# Patient Record
Sex: FEMALE | URBAN - METROPOLITAN AREA
[De-identification: names, ages, dates, MRNs, and addresses within clinical notes are randomized per-mention and may not be internally consistent; named-entity substitution may affect disease eponyms.]

---

## 2023-12-08 ENCOUNTER — APPOINTMENT (RX ONLY)
Dept: URBAN - METROPOLITAN AREA CLINIC 17 | Facility: CLINIC | Age: 48
Setting detail: DERMATOLOGY
End: 2023-12-08

## 2023-12-08 DIAGNOSIS — Z41.9 ENCOUNTER FOR PROCEDURE FOR PURPOSES OTHER THAN REMEDYING HEALTH STATE, UNSPECIFIED: ICD-10-CM

## 2023-12-08 PROCEDURE — ? BOTOX

## 2023-12-08 NOTE — PROCEDURE: BOTOX
Depressor Anguli Oris Units: 0
Detail Level: Detailed
Additional Area 5 Location: neck
Show Additional Area 4: Yes
Additional Area 4 Location: chin
Show Ucl Units: No
Additional Area 3 Location: upper lip
Lot #: [id number]
Additional Area 6 Units: 41
Expiration Date (Month Year): 04/30/2026
Additional Area 2 Location: periocular skin
Incrementing Botox Units: By 0.1 Units
Additional Area 6 Location: see diagram
Dilution (U/0.1 Cc): 1
Additional Area 1 Location: forehead, glabella
Consent: Written consent obtained. Risks include but not limited to lid/brow ptosis, bruising, swelling, diplopia temporary effect, incomplete chemical denervation

## 2024-02-05 ENCOUNTER — APPOINTMENT (RX ONLY)
Dept: URBAN - METROPOLITAN AREA CLINIC 17 | Facility: CLINIC | Age: 49
Setting detail: DERMATOLOGY
End: 2024-02-05

## 2024-02-05 DIAGNOSIS — Z41.9 ENCOUNTER FOR PROCEDURE FOR PURPOSES OTHER THAN REMEDYING HEALTH STATE, UNSPECIFIED: ICD-10-CM

## 2024-02-05 PROCEDURE — ? BOTOX

## 2024-02-05 NOTE — HPI: COSMETIC (BOTOX)
Have You Had Botox Before?: has had botox
Additional History: Botox touch up treated as courtesy today.

## 2024-02-05 NOTE — PROCEDURE: BOTOX
Periorbital Skin Units: 0
Show Lcl Units: No
Show Additional Area 6: Yes
Lot #: [id number]
Additional Area 2 Location: periocular skin
Consent: Written consent obtained. Risks include but not limited to lid/brow ptosis, bruising, swelling, diplopia temporary effect, incomplete chemical denervation
Dilution (U/0.1 Cc): 1
Additional Area 6 Location: see diagram
Incrementing Botox Units: By 0.1 Units
Additional Area 1 Location: forehead, glabella
Additional Area 5 Location: neck
Detail Level: Zone
Additional Area 4 Location: chin
Additional Area 3 Location: upper lip
Expiration Date (Month Year): 03/31/2026
Additional Area 6 Units: 5

## 2024-04-12 ENCOUNTER — APPOINTMENT (RX ONLY)
Dept: URBAN - METROPOLITAN AREA CLINIC 17 | Facility: CLINIC | Age: 49
Setting detail: DERMATOLOGY
End: 2024-04-12

## 2024-04-12 DIAGNOSIS — Z41.9 ENCOUNTER FOR PROCEDURE FOR PURPOSES OTHER THAN REMEDYING HEALTH STATE, UNSPECIFIED: ICD-10-CM

## 2024-04-12 PROCEDURE — ? BOTOX

## 2024-04-12 NOTE — PROCEDURE: BOTOX
Left Pupillary Line Units: 0
Show Right And Left Pupillary Line Units: No
Dilution (U/0.1 Cc): 1
Additional Area 6 Location: see diagram
Show Additional Area 5: Yes
Additional Area 5 Location: neck
Consent: Written consent obtained. Risks include but not limited to lid/brow ptosis, bruising, swelling, diplopia temporary effect, incomplete chemical denervation
Additional Area 4 Location: chin
Incrementing Botox Units: By 0.1 Units
Additional Area 3 Location: upper lip
Additional Area 1 Location: forehead, glabella
Additional Area 6 Units: 41
Additional Area 2 Location: periocular skin
Detail Level: Zone
Lot #: [id number]
Expiration Date (Month Year): 04/30/2026

## 2024-05-24 ENCOUNTER — APPOINTMENT (RX ONLY)
Dept: URBAN - METROPOLITAN AREA CLINIC 17 | Facility: CLINIC | Age: 49
Setting detail: DERMATOLOGY
End: 2024-05-24

## 2024-05-24 DIAGNOSIS — Z41.9 ENCOUNTER FOR PROCEDURE FOR PURPOSES OTHER THAN REMEDYING HEALTH STATE, UNSPECIFIED: ICD-10-CM

## 2024-05-24 PROCEDURE — ? BOTOX

## 2024-05-24 NOTE — PROCEDURE: BOTOX
Price (Use Numbers Only, No Special Characters Or $): 0
Additional Area 2 Location: periocular skin
Show Additional Area 6: Yes
Show Right And Left Periorbital Units: No
Additional Area 6 Location: see diagram
Incrementing Botox Units: By 0.1 Units
Dilution (U/0.1 Cc): 1
Additional Area 1 Location: forehead, glabella
Consent: Written consent obtained. Risks include but not limited to lid/brow ptosis, bruising, swelling, diplopia temporary effect, incomplete chemical denervation
Additional Area 5 Location: neck
Detail Level: Zone
Comments: No charge.
Additional Area 4 Location: chin
Additional Area 3 Location: upper lip
Expiration Date (Month Year): 05/31/2026
Additional Area 6 Units: 5
Lot #: [id number]

## 2024-12-11 ENCOUNTER — APPOINTMENT (OUTPATIENT)
Dept: URBAN - METROPOLITAN AREA CLINIC 41 | Facility: CLINIC | Age: 49
Setting detail: DERMATOLOGY
End: 2024-12-11

## 2024-12-11 DIAGNOSIS — Z41.9 ENCOUNTER FOR PROCEDURE FOR PURPOSES OTHER THAN REMEDYING HEALTH STATE, UNSPECIFIED: ICD-10-CM

## 2024-12-11 PROCEDURE — ? BOTOX

## 2024-12-11 NOTE — PROCEDURE: BOTOX
Inferior Lateral Orbicularis Oculi Units: 0
Show Additional Area 2: Yes
Additional Area 1 Units: 41
Show Ucl Units: No
Additional Area 1 Location: see diagram
Lot #: [id number]
Detail Level: Zone
Expiration Date (Month Year): 03/31/27
Dilution (U/0.1 Cc): 1
Incrementing Botox Units: By 0.1 Units
Consent: Written consent obtained. Risks include but not limited to lid/brow ptosis, bruising, swelling, diplopia temporary effect, incomplete chemical denervation